# Patient Record
Sex: FEMALE | Race: WHITE | Employment: OTHER | URBAN - METROPOLITAN AREA
[De-identification: names, ages, dates, MRNs, and addresses within clinical notes are randomized per-mention and may not be internally consistent; named-entity substitution may affect disease eponyms.]

---

## 2022-07-24 ENCOUNTER — APPOINTMENT (OUTPATIENT)
Dept: GENERAL RADIOLOGY | Age: 67
End: 2022-07-24
Payer: MEDICARE

## 2022-07-24 ENCOUNTER — APPOINTMENT (OUTPATIENT)
Dept: CT IMAGING | Age: 67
End: 2022-07-24
Payer: MEDICARE

## 2022-07-24 ENCOUNTER — HOSPITAL ENCOUNTER (EMERGENCY)
Age: 67
Discharge: HOME | End: 2022-07-24
Payer: MEDICARE

## 2022-07-24 VITALS
BODY MASS INDEX: 27.95 KG/M2 | TEMPERATURE: 97.9 F | DIASTOLIC BLOOD PRESSURE: 73 MMHG | HEIGHT: 65 IN | RESPIRATION RATE: 16 BRPM | HEART RATE: 68 BPM | SYSTOLIC BLOOD PRESSURE: 145 MMHG | OXYGEN SATURATION: 97 % | WEIGHT: 167.77 LBS

## 2022-07-24 DIAGNOSIS — S80.211A ABRASION OF RIGHT KNEE, INITIAL ENCOUNTER: ICD-10-CM

## 2022-07-24 DIAGNOSIS — S09.90XA INJURY OF HEAD, INITIAL ENCOUNTER: ICD-10-CM
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
## 2022-07-24 DIAGNOSIS — S80.01XA CONTUSION OF RIGHT KNEE, INITIAL ENCOUNTER: ICD-10-CM

## 2022-07-24 DIAGNOSIS — S01.01XA LACERATION OF SCALP, INITIAL ENCOUNTER: ICD-10-CM

## 2022-07-24 PROCEDURE — 99284 EMERGENCY DEPT VISIT MOD MDM: CPT

## 2022-07-24 PROCEDURE — 72125 CT NECK SPINE W/O DYE: CPT

## 2022-07-24 PROCEDURE — 73560 X-RAY EXAM OF KNEE 1 OR 2: CPT

## 2022-07-24 PROCEDURE — 6370000000 HC RX 637 (ALT 250 FOR IP): Performed by: GENERAL ACUTE CARE HOSPITAL

## 2022-07-24 PROCEDURE — 70450 CT HEAD/BRAIN W/O DYE: CPT

## 2022-07-24 PROCEDURE — 90471 IMMUNIZATION ADMIN: CPT | Performed by: GENERAL ACUTE CARE HOSPITAL

## 2022-07-24 PROCEDURE — 6360000002 HC RX W HCPCS: Performed by: GENERAL ACUTE CARE HOSPITAL

## 2022-07-24 PROCEDURE — 12031 INTMD RPR S/A/T/EXT 2.5 CM/<: CPT

## 2022-07-24 PROCEDURE — 90715 TDAP VACCINE 7 YRS/> IM: CPT | Performed by: GENERAL ACUTE CARE HOSPITAL

## 2022-07-24 RX ORDER — ACETAMINOPHEN 500 MG
1000 TABLET ORAL ONCE
Status: COMPLETED | OUTPATIENT
Start: 2022-07-24 | End: 2022-07-24

## 2022-07-24 RX ORDER — GINSENG 100 MG
CAPSULE ORAL ONCE
Status: COMPLETED | OUTPATIENT
Start: 2022-07-24 | End: 2022-07-24

## 2022-07-24 RX ORDER — IBUPROFEN 600 MG/1
600 TABLET ORAL ONCE
Status: COMPLETED | OUTPATIENT
Start: 2022-07-24 | End: 2022-07-24

## 2022-07-24 RX ADMIN — TETANUS TOXOID, REDUCED DIPHTHERIA TOXOID AND ACELLULAR PERTUSSIS VACCINE, ADSORBED 0.5 ML: 5; 2.5; 8; 8; 2.5 SUSPENSION INTRAMUSCULAR at 18:50

## 2022-07-24 RX ADMIN — IBUPROFEN 600 MG: 600 TABLET, FILM COATED ORAL at 19:59

## 2022-07-24 RX ADMIN — ACETAMINOPHEN 1000 MG: 500 TABLET ORAL at 18:49

## 2022-07-24 RX ADMIN — BACITRACIN: 500 OINTMENT TOPICAL at 19:15

## 2022-07-24 ASSESSMENT — ENCOUNTER SYMPTOMS
COUGH: 0
WHEEZING: 0
VOMITING: 0
ABDOMINAL PAIN: 0
CHEST TIGHTNESS: 0
SHORTNESS OF BREATH: 0
SORE THROAT: 0
NAUSEA: 0

## 2022-07-24 ASSESSMENT — PAIN SCALES - GENERAL
PAINLEVEL_OUTOF10: 4
PAINLEVEL_OUTOF10: 3

## 2022-07-24 ASSESSMENT — PAIN DESCRIPTION - LOCATION: LOCATION: HEAD

## 2022-07-24 NOTE — ED TRIAGE NOTES
Patient to the ER via EMS s/p fall. 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 Small lac noted to the right side of her head. Patient denies LOC, denies blood thinners. No signs of distress noted. Does c/o headache. Dameon Holloway NP at the bedside and evaluated the patient.

## 2022-07-24 NOTE — ED PROVIDER NOTES
[address] [facility]        Pt Name: Taylor Steve  MRN: [id number]  [place] 1955  Date of evaluation: 7/24/2022  Provider: CAYDEN Joyce - OMKAR  PCP: Sravan Kang  Note Started: 7:54 PM EDT       LAVERNE. I have evaluated this patient. My supervising physician was available for consultation. 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 Patient denies LOC. HISTORY OF PRESENT ILLNESS   (Location, Timing/Onset, Context/Setting, Quality, Duration, Modifying Factors, Severity, Associated Signs and Symptoms)  Note limiting factors. Chief Complaint: Fall, scalp laceration    Taylor Steve is a 79 y.o. female who presents to the emergency department today for evaluation of injuries after sustaining mechanical fall while working at [email or web address].  Patient states that she tripped on uneven pavement. She did hit her head however there was no loss of consciousness. She states that she definitely felt dazed. She sustained a laceration which bled heavily. Bleeding was controlled with direct pressure. Patient is not anticoagulated. She does report a headache and \"fogginess\". She also reports neck discomfort and right knee pain. Patient states she feels \"sore all over\". Her tetanus is not up-to-date. She currently reports a pain level of 5 out of 10. She describes pain as constant dull and aching. The pain is nonmigratory. She has not taken anything for her symptoms. Patient denies having any chest pain or trouble breathing. She denies recent travel or known sick contacts. She denies fever, chills, or other symptoms. Nursing Notes were all reviewed and agreed with or any disagreements were addressed in the HPI.     REVIEW OF SYSTEMS    (2-9 systems for level 4, 10 or more for level 5)     Review of Systems   Constitutional:  Negative for chills, fatigue and fever. HENT:  Negative for ear discharge and sore throat. Eyes:  Negative for visual disturbance. Respiratory:  Negative for cough, chest tightness, shortness of breath and wheezing. Cardiovascular:  Negative for chest pain and palpitations. Gastrointestinal:  Negative for abdominal pain, nausea and vomiting. Endocrine: Negative for polydipsia and polyuria. Genitourinary:  Negative for difficulty urinating, dysuria and flank pain. Musculoskeletal:  Positive for arthralgias, myalgias, neck pain and neck stiffness. Negative for gait problem and joint swelling. Skin:  Positive for wound. Negative for rash. Allergic/Immunologic: Negative for immunocompromised state. Neurological:  Negative for dizziness, weakness and light-headedness. Hematological:  Does not bruise/bleed easily. Psychiatric/Behavioral:  Negative for suicidal ideas. Positives and Pertinent negatives as per HPI. Except as noted above in the ROS, all other systems were reviewed and negative. PAST MEDICAL HISTORY   History reviewed. No pertinent past medical history. SURGICAL HISTORY     Past Surgical History:   Procedure Laterality Date    EYE SURGERY      HYSTERECTOMY (CERVIX STATUS UNKNOWN)           CURRENTMEDICATIONS       Previous Medications    No medications on file         ALLERGIES     Bee venom, Iv dye [iodides], and Minocycline    FAMILYHISTORY     History reviewed. No pertinent family history.        SOCIAL HISTORY          SCREENINGS    Erum Coma Scale  Eye Opening: Spontaneous  Best Verbal Response: Oriented  Best Motor Response: Obeys commands  [place] Coma Scale Score: 15        PHYSICAL EXAM    (up to 7 for level 4, 8 or more for level 5)     ED Triage Vitals   BP Temp Temp Source Heart Rate Resp SpO2 Height Weight   07/24/22 1815 07/24/22 1815 07/24/22 1815 07/24/22 1815 07/24/22 1815 07/24/22 1815 07/24/22 1817 07/24/22 1817   (!) 145/73 97.9 °F (36.6 °C) Oral 68 16 97 % 5' 5\" (1.651 m) 167 lb 12.3 oz (76.1 kg)       Physical Exam  Vitals and nursing note reviewed. Constitutional:       General: She is not in acute distress. Appearance: Normal appearance. She is not ill-appearing or toxic-appearing. HENT:      Head: Normocephalic. No raccoon eyes, Denise's sign, right periorbital erythema or left periorbital erythema. Jaw: There is normal jaw occlusion. Comments: Approximate 2.5 cm irregular laceration noted to right parietal scalp, no active bleeding. Right Ear: Tympanic membrane, ear canal and external ear normal.      Left Ear: Tympanic membrane, ear canal and external ear normal.      Nose: Nose normal.      Mouth/Throat:      Mouth: Mucous membranes are moist.      Pharynx: Oropharynx is clear. Eyes:      General:         Right eye: No discharge. Left eye: No discharge. Extraocular Movements: Extraocular movements intact. Conjunctiva/sclera: Conjunctivae normal.   Neck:      Vascular: No carotid bruit. Cardiovascular:      Rate and Rhythm: Normal rate and regular rhythm. Pulses: Normal pulses. Heart sounds: Normal heart sounds. Pulmonary:      Effort: Pulmonary effort is normal. No respiratory distress. Breath sounds: Normal breath sounds. Abdominal:      General: Bowel sounds are normal.      Palpations: Abdomen is soft. Tenderness: There is no abdominal tenderness. There is no right CVA tenderness or left CVA tenderness. Musculoskeletal:      Cervical back: Normal range of motion and neck supple. Tenderness present. No rigidity. Right knee: Ecchymosis and bony tenderness present. No swelling or crepitus. Normal range of motion. Tenderness present. Normal pulse. Lymphadenopathy:      Cervical: No cervical adenopathy. Skin:     General: Skin is warm and dry.    Neurological:      Mental Status: She is alert and oriented to person, place, and time. Psychiatric:         Mood and Affect: Mood normal.         Behavior: Behavior normal.       DIAGNOSTIC RESULTS   LABS:    Labs Reviewed - No data to display    When ordered only abnormal lab results are displayed. All other labs were within normal range or not returned as of this dictation. EKG: When ordered, EKG's are interpreted by the Emergency Department Physician in the absence of a cardiologist.  Please see their note for interpretation of EKG. RADIOLOGY:   Non-plain film images such as CT, Ultrasound and MRI are read by the radiologist. Plain radiographic images are visualized and preliminarily interpreted by the ED Provider with the below findings:        Interpretation per the Radiologist below, if available at the time of this note:    XR KNEE RIGHT (1-2 VIEWS)   Final Result   Mild osteoarthritic changes in the knee and a small suprapatellar effusion   with no acute bony abnormality. CT HEAD WO CONTRAST   Final Result   No evidence of acute intracranial abnormality. A small laceration overlies the right parietal bone without evidence of   underlying osseous abnormality. CT CERVICAL SPINE WO CONTRAST   Final Result   Degenerative changes of the cervical spine without evidence of acute osseous   abnormality. XR KNEE RIGHT (1-2 VIEWS)    Result Date: 7/24/2022  EXAMINATION: 2 XRAY VIEWS OF THE RIGHT KNEE 7/24/2022 6:57 pm COMPARISON: None HISTORY: ORDERING SYSTEM PROVIDED HISTORY: fall TECHNOLOGIST PROVIDED HISTORY: Reason for exam:->fall Reason for Exam: fall FINDINGS: There is mild joint space narrowing in the knee. There is mild narrowing of the patellofemoral joint with a small suprapatellar effusion. No fracture or dislocation is seen. Mild osteoarthritic changes in the knee and a small suprapatellar effusion with no acute bony abnormality.      CT HEAD WO CONTRAST    Result Date: 7/24/2022  EXAMINATION: CT OF THE HEAD WITHOUT CONTRAST 7/24/2022 6:38 pm TECHNIQUE: CT of the head was performed without the administration of intravenous contrast. Automated exposure control, iterative reconstruction, and/or weight based adjustment of the mA/kV was utilized to reduce the radiation dose to as low as reasonably achievable. COMPARISON: None. HISTORY: ORDERING SYSTEM PROVIDED HISTORY: fall/head injury TECHNOLOGIST PROVIDED HISTORY: Reason for exam:->fall/head injury Has a \"code stroke\" or \"stroke alert\" been called? ->No Decision Support Exception - unselect if not a suspected or confirmed emergency medical condition->Emergency Medical Condition (MA) Reason for Exam: FELL WITH HEAD INJURY FINDINGS: BRAIN/VENTRICLES: There is no acute intracranial hemorrhage, mass effect or midline shift. No abnormal extra-axial fluid collection. The gray-white differentiation is maintained without evidence of an acute infarct. There is no evidence of hydrocephalus. ORBITS: The visualized portion of the orbits demonstrate no acute abnormality. SINUSES: The visualized paranasal sinuses and mastoid air cells demonstrate no acute abnormality. SOFT TISSUES/SKULL:  No acute abnormality of the visualized skull. There is a small laceration overlying the right parietal bone. No evidence of acute intracranial abnormality. A small laceration overlies the right parietal bone without evidence of underlying osseous abnormality. CT CERVICAL SPINE WO CONTRAST    Result Date: 7/24/2022  EXAMINATION: CT OF THE CERVICAL SPINE WITHOUT CONTRAST 7/24/2022 6:38 pm TECHNIQUE: CT of the cervical spine was performed without the administration of intravenous contrast. Multiplanar reformatted images are provided for review. Automated exposure control, iterative reconstruction, and/or weight based adjustment of the mA/kV was utilized to reduce the radiation dose to as low as reasonably achievable. COMPARISON: None.  HISTORY: ORDERING SYSTEM PROVIDED HISTORY: fall/injury TECHNOLOGIST PROVIDED HISTORY: Reason for exam:->fall/injury Decision Support Exception - unselect if not a suspected or confirmed emergency medical condition->Emergency Medical Condition (MA) Reason for Exam: FELL HEAD INJURY FINDINGS: BONES/ALIGNMENT: There is no acute fracture or traumatic malalignment. Remote fracture versus nonfusion of the tip of the C7 spinous process. There is mild reversal of normal cervical lordosis. DEGENERATIVE CHANGES: Multilevel degenerative changes of the cervical spine including moderate degenerative disc disease at C4-C5, C5-C6, and C6-C7 with posterior osteophyte formation at C4-C5 and C5-C6. SOFT TISSUES: There is no prevertebral soft tissue swelling. Degenerative changes of the cervical spine without evidence of acute osseous abnormality. PROCEDURES   Unless otherwise noted below, none     Lac Repair    Date/Time: 7/24/2022 7:14 PM  Performed by: CAYDEN Burciaga CNP  Authorized by:  CAYDEN Burciaga CNP     Consent:     Consent obtained:  Verbal    Consent given by:  Patient    Risks, benefits, and alternatives were discussed: yes      Risks discussed:  Infection, pain, need for additional repair and poor cosmetic result    Alternatives discussed:  No treatment  Universal protocol:     Procedure explained and questions answered to patient or proxy's satisfaction: yes      Relevant documents present and verified: yes      Imaging studies available: yes      Patient identity confirmed:  Verbally with patient and arm band  Anesthesia:     Anesthesia method:  None  Laceration details:     Location:  Scalp    Scalp location:  R parietal    Length (cm):  2.5  Pre-procedure details:     Preparation:  Patient was prepped and draped in usual sterile fashion and imaging obtained to evaluate for foreign bodies  Exploration:     Hemostasis achieved with:  Direct pressure    Imaging outcome: foreign body not noted      Wound exploration: wound explored through full range of motion and entire depth of wound visualized      Contaminated: no    Treatment:     Amount of cleaning:  Extensive    Irrigation solution:  Sterile saline    Irrigation method:  Syringe  Skin repair:     Repair method:  Staples    Number of staples:  2  Approximation:     Approximation:  Close  Repair type:     Repair type:  Simple  Post-procedure details:     Dressing:  Antibiotic ointment and adhesive bandage    Procedure completion:  Tolerated    CRITICAL CARE TIME   I personally saw the patient and independently provided 21 minutes of non-concurrent critical care time out of the total critical care time provided. This excludes time spent doing separately billable procedures. This includes time at the bedside, data interpretation, medication management, obtaining critical history from collateral sources if the patient is unable to provide it directly, and physician consultation. Specifics of interventions taken and potentially life-threatening diagnostic considerations are listed above in the medical decision making. CONSULTS:  None      EMERGENCY DEPARTMENT COURSE and DIFFERENTIAL DIAGNOSIS/MDM:   Vitals:    Vitals:    07/24/22 1815 07/24/22 1817   BP: (!) 145/73    Pulse: 68    Resp: 16    Temp: 97.9 °F (36.6 °C)    TempSrc: Oral    SpO2: 97%    Weight:  167 lb 12.3 oz (76.1 kg)   Height:  5' 5\" (1.651 m)       Patient was given the following medications:  Medications   tetanus-diphth-acell pertussis (BOOSTRIX) injection 0.5 mL (0.5 mLs IntraMUSCular Given 7/24/22 1850)   acetaminophen (TYLENOL) tablet 1,000 mg (1,000 mg Oral Given 7/24/22 1849)   bacitracin ointment ( Topical Given 7/24/22 1915)   ibuprofen (ADVIL;MOTRIN) tablet 600 mg (600 mg Oral Given 7/24/22 1959)         Is this patient to be included in the SEP-1 Core Measure due to severe sepsis or septic shock? No   Exclusion criteria - the patient is NOT to be included for SEP-1 Core Measure due to:   Infection is not suspected    Previous records reviewed in order to gain further information regarding patient's PMH as well as her HPI. Nursing notes reviewed. This is a 59-year-old female who presents to the emergency department today reporting injuries after sustaining a mechanical fall. Physical exam complete. Patient arrives nontoxic, afebrile, mildly hypertensive. GCS is 15. She is without any focal neurologic deficits. Wound cleansed thoroughly with chlorhexidine solution. CT head and neck interpreted by radiologist are negative for acute findings. Right knee x-ray interpreted by radiologist reviewed by myself shows osteoarthritis but no evidence of acute fracture or malalignment. Laceration repaired, see above note. At this time there is no evidence of any life-threatening or emergent conditions requiring immediate intervention. No evidence of skull fracture or intracranial hemorrhage. Patient is ambulatory unassisted. Patient will be discharged with emphasis on close outpatient follow-up. She is advised to apply ice to any sore area for 20 minutes every 3-4 hours. She is encouraged to take OTC Tylenol or ibuprofen as directed for discomfort. She agrees return for high fever, incessant vomiting, severe pain, or any other worsening symptoms. Patient is discharged home in stable condition. FINAL IMPRESSION      1. Fall, initial encounter    2. Laceration of scalp, initial encounter    3. Injury of head, initial encounter    4. Contusion of right knee, initial encounter    5.  Abrasion of right knee, initial encounter          DISPOSITION/PLAN   DISPOSITION Decision To Discharge 07/24/2022 08:02:03 PM      PATIENT REFERRED TO:  Real Bermudez  [address] [place] [id number] [phone]    In 2 days      DISCHARGE MEDICATIONS:  New Prescriptions    No medications on file       DISCONTINUED MEDICATIONS:  Discontinued Medications    No medications on file              (Please note that portions of this note were completed with a voice recognition program.  Efforts were made to edit the dictations but occasionally words are mis-transcribed.)    CAYDEN Stanton CNP (electronically signed)           CAYDEN Stanton CNP  07/24/22 2005

## 2022-07-25 NOTE — DISCHARGE INSTRUCTIONS
Take OTC Tylenol or ibuprofen as directed for discomfort. Apply ice to any sore area for 20 minutes every 3-4 hours. It is importantly follow-up with your PCP within the next 2 to 3 days for reevaluation. The 2 staples should be removed by your PCP in 7 days. Return to nearest ED for high fever, incessant vomiting, severe pain, any other worsening symptoms.

## 2022-07-25 NOTE — ED NOTES
Discharge and education instructions reviewed. Patient verbalized understanding, teach-back successful. Patient denied questions at this time. No acute distress noted. Patient instructed to follow-up as noted - return to emergency department if symptoms worsen. Patient verbalized understanding. Discharged per EDMD with discharged instructions.        Chilo Anderson RN  07/24/22 2015